# Patient Record
Sex: MALE | Race: WHITE | NOT HISPANIC OR LATINO | Employment: OTHER | URBAN - METROPOLITAN AREA
[De-identification: names, ages, dates, MRNs, and addresses within clinical notes are randomized per-mention and may not be internally consistent; named-entity substitution may affect disease eponyms.]

---

## 2022-10-04 ENCOUNTER — OFFICE VISIT (OUTPATIENT)
Dept: ENDOCRINOLOGY | Facility: CLINIC | Age: 67
End: 2022-10-04

## 2022-10-04 ENCOUNTER — TELEPHONE (OUTPATIENT)
Dept: ENDOCRINOLOGY | Facility: CLINIC | Age: 67
End: 2022-10-04

## 2022-10-04 VITALS
HEIGHT: 76 IN | SYSTOLIC BLOOD PRESSURE: 144 MMHG | DIASTOLIC BLOOD PRESSURE: 76 MMHG | WEIGHT: 261 LBS | HEART RATE: 74 BPM | BODY MASS INDEX: 31.78 KG/M2 | OXYGEN SATURATION: 98 %

## 2022-10-04 DIAGNOSIS — E11.65 TYPE 2 DIABETES MELLITUS WITH HYPERGLYCEMIA, UNSPECIFIED WHETHER LONG TERM INSULIN USE: Primary | ICD-10-CM

## 2022-10-04 DIAGNOSIS — E78.5 HYPERLIPIDEMIA, UNSPECIFIED HYPERLIPIDEMIA TYPE: ICD-10-CM

## 2022-10-04 LAB
EXPIRATION DATE: ABNORMAL
EXPIRATION DATE: NORMAL
GLUCOSE BLDC GLUCOMTR-MCNC: 179 MG/DL (ref 70–130)
HBA1C MFR BLD: 9.2 %
Lab: ABNORMAL
Lab: NORMAL

## 2022-10-04 PROCEDURE — 83036 HEMOGLOBIN GLYCOSYLATED A1C: CPT | Performed by: INTERNAL MEDICINE

## 2022-10-04 PROCEDURE — 99204 OFFICE O/P NEW MOD 45 MIN: CPT | Performed by: INTERNAL MEDICINE

## 2022-10-04 PROCEDURE — 3046F HEMOGLOBIN A1C LEVEL >9.0%: CPT | Performed by: INTERNAL MEDICINE

## 2022-10-04 PROCEDURE — 82947 ASSAY GLUCOSE BLOOD QUANT: CPT | Performed by: INTERNAL MEDICINE

## 2022-10-04 RX ORDER — MULTIVIT WITH MINERALS/LUTEIN
1000 TABLET ORAL DAILY
COMMUNITY
End: 2023-02-15

## 2022-10-04 RX ORDER — INSULIN LISPRO 100 [IU]/ML
INJECTION, SOLUTION INTRAVENOUS; SUBCUTANEOUS
Qty: 75 ML | Refills: 1 | Status: SHIPPED | OUTPATIENT
Start: 2022-10-04 | End: 2022-12-14 | Stop reason: SDUPTHER

## 2022-10-04 RX ORDER — ATORVASTATIN CALCIUM 80 MG/1
80 TABLET, FILM COATED ORAL DAILY
COMMUNITY
Start: 2022-08-11

## 2022-10-04 RX ORDER — INSULIN HUMAN 100 [IU]/ML
20 INJECTION, SUSPENSION SUBCUTANEOUS 3 TIMES DAILY
COMMUNITY
Start: 2022-07-07 | End: 2022-10-04

## 2022-10-04 RX ORDER — INSULIN DEGLUDEC INJECTION 100 U/ML
64 INJECTION, SOLUTION SUBCUTANEOUS DAILY
COMMUNITY
Start: 2022-09-14 | End: 2023-02-15

## 2022-10-04 RX ORDER — FENOFIBRATE 160 MG/1
160 TABLET ORAL DAILY
COMMUNITY
Start: 2022-08-27

## 2022-10-04 RX ORDER — CITALOPRAM 20 MG/1
20 TABLET ORAL DAILY
COMMUNITY
Start: 2022-07-20

## 2022-10-04 RX ORDER — OMEGA-3S/DHA/EPA/FISH OIL 1000-1400
CAPSULE,DELAYED RELEASE (ENTERIC COATED) ORAL DAILY
COMMUNITY

## 2022-10-04 RX ORDER — MULTIPLE VITAMINS W/ MINERALS TAB 9MG-400MCG
1 TAB ORAL DAILY
COMMUNITY

## 2022-10-04 RX ORDER — UBIDECARENONE 100 MG
100 CAPSULE ORAL DAILY
COMMUNITY

## 2022-10-04 RX ORDER — RAMIPRIL 10 MG/1
10 CAPSULE ORAL DAILY
COMMUNITY
Start: 2022-09-24

## 2022-10-04 NOTE — PROGRESS NOTES
Chief Complaint   Patient presents with   • Diabetes        New patient who is being seen in consultation regarding type 2 diabetes at the request of David Mar MD     HPI   Ayad Tong is a 67 y.o. male who presents for evaluation of type 2 diabetes.    Patient has a history of type 2 diabetes.  Patient reports that he was originally started on oral agents for diabetes.  He is unable to recall all the different medications that he has taken but does state that he did not tolerate metformin due to GI side effects.  He reports no side effects to the medications he was tried on but states they were ineffective in controlling his sugar and thus he was started on insulin.  Patient is currently taking Tresiba 64 units daily, Humulin N 20 units 3 times daily.  This regimen was last changed approximately 1 year ago.  Patient believes that glycemic control is worsening. Patient reports good adherence to medications.      Patient reports prior hypoglycemia with intense activity, none recent.  Patient monitors blood glucose daily in the morning.  He reports that fasting values generally around 140  Patient follows a diabetic diet.    Patient's last dilated eye exam was within the past year.  Patient denies acute visual changes.  Patient denies foot related concerns such as numbness, tingling, or pain.  History of dyslipidemia: Yes, taking atorvastatin 80 mg daily.  Patient denies myalgias  History of renal dysfunction: No  History of Hypertension: No    Past Medical History:   Diagnosis Date   • Type 2 diabetes mellitus (HCC)      Past Surgical History:   Procedure Laterality Date   • CARDIAC CATHETERIZATION     • NOSE SURGERY     • SHOULDER SURGERY     • TONSILLECTOMY        Family History   Problem Relation Age of Onset   • Dementia Mother    • Cancer Father    • Lung cancer Father    • Asthma Sister         hay fever   • No Known Problems Daughter    • No Known Problems Son       Social History     Socioeconomic  History   • Marital status:    Tobacco Use   • Smokeless tobacco: Never Used   Vaping Use   • Vaping Use: Never used   Substance and Sexual Activity   • Alcohol use: Never   • Drug use: Never   • Sexual activity: Defer      Allergies   Allergen Reactions   • Demerol [Meperidine] Nausea Only     And causes hypertension      Current Outpatient Medications on File Prior to Visit   Medication Sig Dispense Refill   • Ascorbic Acid 500 MG capsule Take  by mouth.     • atorvastatin (LIPITOR) 80 MG tablet Take 80 mg by mouth Daily.     • CINNAMON PO Take  by mouth 2 (Two) Times a Day. 2000 mg bid     • citalopram (CeleXA) 20 MG tablet Take 20 mg by mouth Daily.     • coenzyme Q10 100 MG capsule Take 100 mg by mouth Daily.     • fenofibrate 160 MG tablet Take 160 mg by mouth Daily.     • multivitamin with minerals tablet tablet Take 1 tablet by mouth Daily.     • Omega-3 Fatty Acids (Fish Oil Ultra) 1400 MG capsule Take  by mouth Daily.     • ramipril (ALTACE) 10 MG capsule Take 10 mg by mouth Daily.     • Tresiba FlexTouch 100 UNIT/ML solution pen-injector injection Inject 64 Units under the skin into the appropriate area as directed Daily.     • vitamin E 1000 UNIT capsule Take 1,000 Units by mouth Daily.     • [DISCONTINUED] HumuLIN N KwikPen 100 UNIT/ML injection Inject 20 Units under the skin into the appropriate area as directed 3 (Three) Times a Day.       No current facility-administered medications on file prior to visit.        Review of Systems   Constitutional: Negative for activity change, appetite change, unexpected weight gain and unexpected weight loss.   HENT: Positive for hearing loss and tinnitus.    Eyes: Negative for visual disturbance.   Gastrointestinal: Negative for abdominal pain, nausea and vomiting.   Endocrine: Negative for polydipsia and polyuria.   Musculoskeletal: Negative for myalgias.   Allergic/Immunologic: Positive for environmental allergies.     Vitals:    10/04/22 0827   BP:  "144/76   BP Location: Right arm   Patient Position: Sitting   Cuff Size: Adult   Pulse: 74   SpO2: 98%   Weight: 118 kg (261 lb)   Height: 193 cm (76\")   Body mass index is 31.77 kg/m².     Physical Exam  Vitals reviewed.   Constitutional:       General: He is not in acute distress.     Appearance: Normal appearance.   HENT:      Head: Normocephalic.   Eyes:      General: Lids are normal.      Conjunctiva/sclera: Conjunctivae normal.   Neck:      Thyroid: No thyromegaly or thyroid tenderness.   Cardiovascular:      Rate and Rhythm: Normal rate and regular rhythm.      Heart sounds: No murmur heard.  Pulmonary:      Effort: Pulmonary effort is normal.      Breath sounds: Normal breath sounds.   Abdominal:      General: Bowel sounds are normal.      Palpations: Abdomen is soft.      Tenderness: There is no abdominal tenderness.   Lymphadenopathy:      Head:      Right side of head: No submandibular adenopathy.      Left side of head: No submandibular adenopathy.      Cervical: No cervical adenopathy.   Skin:     General: Skin is warm and dry.      Findings: No rash.   Neurological:      General: No focal deficit present.      Mental Status: He is alert.   Psychiatric:         Mood and Affect: Mood and affect normal.         Behavior: Behavior is cooperative.            Labs/Imaging   Latest Reference Range & Units 10/04/22 08:45 10/04/22 08:46   Glucose 70 - 130 mg/dL 179    Hemoglobin A1C %  9.2     Labs dated 6/21/2022  A1c 8.3%  Alkaline phosphatase 48  ALT 33  AST 25  GFR >60  TSH 2.79  Free T4 0.73  LDL 63  Triglycerides 280  Assessment and Plan    Diagnoses and all orders for this visit:    1. Type 2 diabetes mellitus with hyperglycemia, unspecified whether long term insulin use (HCC) (Primary)  Diabetes uncontrolled with hemoglobin A1c 9.2%  Patient reports fasting sugars at goal, suspect hyperglycemia later in the day but patient does not generally check sugar at that time.  Discussed with patient that he is " currently taking a basal and intermediate acting insulin.  Discussed that NPH onset and duration of action does not provide optimal mealtime coverage.  Plan to change Novolin N to a true short acting insulin.  Prescription sent for Humalog.  Patient will take 20 units with meals plus correction 2 per 50 greater than 150  Plan to continue Tresiba 64 units daily as fasting sugars Reported by patient are at goal  Patient was advised to monitor blood sugar 3 times daily.  Patient was instructed to bring glucometer to all future appointments. Patient should contact the clinic between appointments with hypoglycemia or persistent hyperglycemia.  Discussed signs and symptoms of hypoglycemia as well as hypoglycemia management via the rule of 15's.  Discussed potential for long-term complications with uncontrolled diabetes including nephropathy, neuropathy, retinopathy, increased risk for cardiac disease.  Discussed the role of diet and exercise in the management of diabetes.  CMP, lipids up-to-date from June 2022  -     POC Glucose, Blood  -     POC Glycosylated Hemoglobin (Hb A1C)    2. Hyperlipidemia, unspecified hyperlipidemia type  Currently taking atorvastatin 80 mg daily  Most recent LDL at goal    Other orders  -     Insulin Lispro, 1 Unit Dial, (HumaLOG KwikPen) 100 UNIT/ML solution pen-injector; For use at mealtimes and per correctional scale, MDD 80 units  Dispense: 75 mL; Refill: 1     Addendum dated 2/1/2023  Received outside labs from PCP, sent for scanning  Testing relevant to endocrine condition outlined below  labs dated 12/6/22  Hemoglobin A1c 7.8  Alkaline phosphatase 41  ALT 26  AST 22  eGFR 56  TSH 5.61  Free T40.55  Triglycerides 178  LDL 76    Return in about 3 months (around 1/4/2023) for Labs before next appointment. The patient was instructed to contact the clinic with any interval questions or concerns.    Odalis Velazquez MD     Dictated Utilizing Dragon Dictation

## 2022-10-04 NOTE — TELEPHONE ENCOUNTER
Pt called seen today, recevied a sample of Fiasp and was told he would get a mail order. He is leaving town would like to have sent it sent locally to [facility] that we have on file

## 2022-10-05 NOTE — TELEPHONE ENCOUNTER
Called the pt and he stated the phar called him. They told him the Humalog was at the phar but did not have enough. It could be a day or two before they have it in stock.    He verbalized understanding about the Humalog.

## 2022-10-06 ENCOUNTER — TELEPHONE (OUTPATIENT)
Dept: ENDOCRINOLOGY | Facility: CLINIC | Age: 67
End: 2022-10-06

## 2022-10-06 NOTE — TELEPHONE ENCOUNTER
PT CALLED STATING WE CHANGED HER INSULIN WHEN WE SAW HIM LAST. HE STATED HIS BS HAS BEEN RUNNING HIGH SINCE CHANGE TWO DAYS AGO. PT REQUESTED A CALL BACK TO CONSULT.

## 2022-10-06 NOTE — TELEPHONE ENCOUNTER
Called the pt and he stated his blood sugars are going up not down. This morning his blood sugar was 250 something he took 20 units plus 6. He had a breakfast burrito with 36 grams of carbs, coffee with heavy cream and sweetner. 3 hours later is was 340 something. At lunch he took 20 units plus 8. He is still using the sample you gave him.     The only med that is not on him list is an allergy pill. Not on any ABX or steroids    He could not give me any other readings he didn't have the meter with him.    Please advise.

## 2022-10-06 NOTE — TELEPHONE ENCOUNTER
Please contact patient, given hyperglycemia I would have him increase mealtime insulin to 26 units with meals plus correction 2 per 50 greater than 150.  He should continue current dose of basal insulin.  If he is still having issues he should reach back out to the office.  He should also do his best to avoid sugary substances (sweetened coffee).

## 2022-12-14 RX ORDER — INSULIN LISPRO 100 [IU]/ML
INJECTION, SOLUTION INTRAVENOUS; SUBCUTANEOUS
Qty: 75 ML | Refills: 1 | Status: SHIPPED | OUTPATIENT
Start: 2022-12-14 | End: 2023-02-15 | Stop reason: SDUPTHER

## 2023-01-30 ENCOUNTER — TELEPHONE (OUTPATIENT)
Dept: ENDOCRINOLOGY | Facility: CLINIC | Age: 68
End: 2023-01-30
Payer: MEDICARE

## 2023-01-30 NOTE — TELEPHONE ENCOUNTER
PATIENT IS CALLING WITH QUESTIONS ABOUT A NEW PRESCRIPTION FOR UNDERACTIVE THYROID HE THINKS THE MEDICATION IS LEVOTHYROXINE WHICH IS CAUSING ISSUES WITH HIS BLOOD SUGARS RUNNING HIGH. HE WOULD LIKE A CALL BACK TO DISCUSS CONCERNS. PHONE NUMBER [phone]. HE HAS NOT SPOKE TO PCP YET ABOUT THIS ISSUES.

## 2023-01-31 NOTE — TELEPHONE ENCOUNTER
Called the pt he started taking Levothyroxine 4 days ago. And the next day his sugars went high.    He is taking tresiba 64 units daily and Humalog 26 units as a base alog with corrections at mealtimes.    He is sharing on [facility]. Report printed and gave to provider for review.    Please advise.

## 2023-01-31 NOTE — TELEPHONE ENCOUNTER
Freestyle henry reviewed.  Patient does have some hyperglycemia, however, the worst of this appears post-prandial per pattern and actually predates initiation of levothyroxine per report.  It is unlikely that any change in blood sugar is related to levothyroxine.    Of note, average sugar remains at goal despite this.  Patient does have an appointment in approximately 2 weeks.  Please request that he bring recent labs from PCP for review and we can discuss further at that time.  Okay to schedule sooner appointment, if patient desires.

## 2023-01-31 NOTE — TELEPHONE ENCOUNTER
Called pt and read him the message. He verbalized understanding.    He had labs done at Dr Beltran office. I told him I would call and get a copy of the labs to have at [facility].    Called and spoke to Buffy. Requested labs and fax number was provided.

## 2023-02-15 ENCOUNTER — TELEPHONE (OUTPATIENT)
Dept: ENDOCRINOLOGY | Facility: CLINIC | Age: 68
End: 2023-02-15

## 2023-02-15 ENCOUNTER — OFFICE VISIT (OUTPATIENT)
Dept: ENDOCRINOLOGY | Facility: CLINIC | Age: 68
End: 2023-02-15
Payer: MEDICARE

## 2023-02-15 VITALS
WEIGHT: 266 LBS | OXYGEN SATURATION: 97 % | HEART RATE: 80 BPM | BODY MASS INDEX: 32.39 KG/M2 | DIASTOLIC BLOOD PRESSURE: 92 MMHG | SYSTOLIC BLOOD PRESSURE: 154 MMHG | HEIGHT: 76 IN

## 2023-02-15 DIAGNOSIS — E11.65 TYPE 2 DIABETES MELLITUS WITH HYPERGLYCEMIA, UNSPECIFIED WHETHER LONG TERM INSULIN USE: Primary | ICD-10-CM

## 2023-02-15 DIAGNOSIS — E78.5 HYPERLIPIDEMIA, UNSPECIFIED HYPERLIPIDEMIA TYPE: ICD-10-CM

## 2023-02-15 DIAGNOSIS — E03.9 HYPOTHYROIDISM, UNSPECIFIED TYPE: ICD-10-CM

## 2023-02-15 LAB
EXPIRATION DATE: ABNORMAL
EXPIRATION DATE: NORMAL
GLUCOSE BLDC GLUCOMTR-MCNC: 225 MG/DL (ref 70–130)
HBA1C MFR BLD: 6.8 %
Lab: ABNORMAL
Lab: NORMAL

## 2023-02-15 PROCEDURE — 83036 HEMOGLOBIN GLYCOSYLATED A1C: CPT | Performed by: INTERNAL MEDICINE

## 2023-02-15 PROCEDURE — 95251 CONT GLUC MNTR ANALYSIS I&R: CPT | Performed by: INTERNAL MEDICINE

## 2023-02-15 PROCEDURE — 3044F HG A1C LEVEL LT 7.0%: CPT | Performed by: INTERNAL MEDICINE

## 2023-02-15 PROCEDURE — 99214 OFFICE O/P EST MOD 30 MIN: CPT | Performed by: INTERNAL MEDICINE

## 2023-02-15 RX ORDER — INSULIN DEGLUDEC INJECTION 100 U/ML
61 INJECTION, SOLUTION SUBCUTANEOUS DAILY
Qty: 60 ML | Refills: 1 | Status: SHIPPED | OUTPATIENT
Start: 2023-02-15

## 2023-02-15 RX ORDER — INSULIN ASPART 100 [IU]/ML
INJECTION, SOLUTION INTRAVENOUS; SUBCUTANEOUS
Qty: 75 ML | Refills: 1 | Status: SHIPPED | OUTPATIENT
Start: 2023-02-15 | End: 2023-04-05 | Stop reason: SDUPTHER

## 2023-02-15 RX ORDER — LEVOTHYROXINE SODIUM 0.03 MG/1
1 TABLET ORAL DAILY
COMMUNITY
Start: 2023-01-27

## 2023-02-15 RX ORDER — INSULIN LISPRO 100 [IU]/ML
INJECTION, SOLUTION INTRAVENOUS; SUBCUTANEOUS
Qty: 75 ML | Refills: 1 | Status: SHIPPED | OUTPATIENT
Start: 2023-02-15 | End: 2023-02-15

## 2023-02-15 RX ORDER — INSULIN ASPART 100 [IU]/ML
INJECTION, SOLUTION INTRAVENOUS; SUBCUTANEOUS
Qty: 75 ML | Refills: 1 | Status: SHIPPED | OUTPATIENT
Start: 2023-02-15 | End: 2023-02-15 | Stop reason: SDUPTHER

## 2023-02-15 NOTE — TELEPHONE ENCOUNTER
Received paper from the phar stating that Humalog is not covered. Can we change to Novolog with same directions?

## 2023-02-15 NOTE — PROGRESS NOTES
"Chief Complaint   Patient presents with   • Diabetes        HPI   Ayad Tong is a 67 y.o. male had concerns including Diabetes.      Patient presents for follow-up of diabetes, last seen October 2022. Patient does report that he believes glucose values are improved since last visit. He does note that glucose values tend to be highest in the morning. He has has some borderline low sugars after exercise. He was started on levothyroxine in the interim since last visit.    Current diabetes medications include the following:  Tresiba 61 units daily, patient reports he reduced dose as this comes out to the correct amount in each pen without having to do multiple injections.  Humalog 20 units with meals plus correction 2 per 50 greater than 150    Patient continues on atorvastatin 80 mg daily, denies myalgias.    The following portions of the patient's history were reviewed and updated as appropriate: allergies, current medications and past social history.    Review of Systems   Gastrointestinal: Negative for nausea and vomiting.   Endocrine: Negative for polydipsia and polyuria.   Musculoskeletal: Negative for myalgias.      /92 (BP Location: Right arm, Patient Position: Sitting, Cuff Size: Adult)   Pulse 80   Ht 193 cm (76\")   Wt 121 kg (266 lb)   SpO2 97%   BMI 32.38 kg/m²      Physical Exam      Constitutional:  well developed; well nourished  no acute distress   ENT/Thyroid: not examined   Eyes: Conjunctiva: clear   Respiratory:  breathing is unlabored  clear to auscultation bilaterally   Cardiovascular:  regular rate and rhythm   Chest:  Not performed.   Abdomen: soft, non-tender; no masses   : Not performed.   Musculoskeletal: Not performed   Skin: not performed.   Neuro: mental status, speech normal   Psych: mood and affect are within normal limits       Labs/Imaging   Latest Reference Range & Units 02/15/23 08:05 02/15/23 08:06   Glucose 70 - 130 mg/dL 225 !    Hemoglobin A1C %  6.8   !: Data is " abnormal     12/6/2022  TSH 5.61  Free T4     Freestyle henry downloaded for review for dates ranging February 2 through February 15, 2023, CGM active 52% of the time with average glucose 152, GMI 6.9% with 20.8% glucose variability.  82% of data within target range, 18% high.  Glucose values are generally stable, patient does have some rising glucose in the early morning hours as well as following meals.  Episodes of mild hypoglycemia occurring during the day and associated with exercise per patient report.    Diagnoses and all orders for this visit:    1. Type 2 diabetes mellitus with hyperglycemia, unspecified whether long term insulin use (HCC) (Primary)  Hemoglobin A1c at goal, patient with intermittent hyperglycemia, hypoglycemia  CGM containing 72 hours of glucose data was reviewed.  Discussed mechanisms for combating fasting hyperglycemia.  Patient does report he is going to try to start walking each morning, given this we will defer any medication changes  Patient to continue Tresiba 61 units daily  Patient to continue Humalog 20 units with meals plus correction 2 per 50 greater than 150.  We did discuss empiric reduction by 4 units if patient is planning to work out after a meal to avoid hypoglycemia.  Reviewed treatment of hypoglycemia.  Patient was advised to monitor blood sugar 3 times daily if not using CGM.  Patient was instructed to bring glucometer to all future appointments. Patient should contact the clinic between appointments with hypoglycemia or persistent hyperglycemia.  Discussed signs and symptoms of hypoglycemia as well as hypoglycemia management via the rule of 15's.  Discussed potential for long-term complications with uncontrolled diabetes including nephropathy, neuropathy, retinopathy, increased risk for cardiac disease.  Discussed the role of diet and exercise in the management of diabetes.  CMP, lipids up-to-date from June 2022  Patient reports that urine microalbumin was completed  recently with PCP, will request copy  Eye exam is scheduled for this year  -     POC Glucose, Blood  -     POC Glycosylated Hemoglobin (Hb A1C)    2. Hyperlipidemia, unspecified hyperlipidemia type  Lipid panel is up-to-date, continue atorvastatin    3.  Hypothyroidism, unspecified type  Labs from December 2022 reviewed, patient with elevated TSH, low free T4.  Patient taking levothyroxine 25 mcg daily per PCP, too soon to repeat labs     Addendum  Received labs from PCP, most recent urine microalbumin was from December 2021, update next visit    Return in about 4 months (around 6/15/2023). The patient was instructed to contact the clinic with any interval questions or concerns.    Odalis Velazquez MD   Endocrinologist    Dictated Utilizing Dragon Dictation

## 2023-02-15 NOTE — TELEPHONE ENCOUNTER
----- Message from Odalis Velazquez MD sent at 2/15/2023  8:30 AM EST -----  Can you request recent urine microalbumin from PCP?

## 2023-03-08 ENCOUNTER — TELEPHONE (OUTPATIENT)
Dept: ENDOCRINOLOGY | Facility: CLINIC | Age: 68
End: 2023-03-08
Payer: MEDICARE

## 2023-03-08 NOTE — TELEPHONE ENCOUNTER
Called the pt and he needs the [employer] sensors. He would like to have a 90 day supply sent to [facility]. He has been getting it from [employer].

## 2023-03-08 NOTE — TELEPHONE ENCOUNTER
PATIENT WANTS US TO START PRESCRIBING HIS [employer] DEVICE AND SUPPLIES. HIS INSURANCE CHANGED SO HE CAN NO LONGER GET THE PRESCRIPTIONS FROM [employer]. HIS NEW INSURANCE IS NOW [email or web address]. PATIENTS NUMBER [phone]

## 2023-04-05 RX ORDER — INSULIN ASPART 100 [IU]/ML
INJECTION, SOLUTION INTRAVENOUS; SUBCUTANEOUS
Qty: 75 ML | Refills: 1 | Status: SHIPPED | OUTPATIENT
Start: 2023-04-05

## 2023-06-13 ENCOUNTER — OFFICE VISIT (OUTPATIENT)
Dept: ENDOCRINOLOGY | Facility: CLINIC | Age: 68
End: 2023-06-13
Payer: MEDICARE

## 2023-06-13 VITALS
HEART RATE: 68 BPM | HEIGHT: 76 IN | WEIGHT: 267 LBS | SYSTOLIC BLOOD PRESSURE: 140 MMHG | DIASTOLIC BLOOD PRESSURE: 90 MMHG | BODY MASS INDEX: 32.51 KG/M2 | OXYGEN SATURATION: 97 %

## 2023-06-13 DIAGNOSIS — E11.65 TYPE 2 DIABETES MELLITUS WITH HYPERGLYCEMIA, UNSPECIFIED WHETHER LONG TERM INSULIN USE: Primary | ICD-10-CM

## 2023-06-13 DIAGNOSIS — E78.5 HYPERLIPIDEMIA, UNSPECIFIED HYPERLIPIDEMIA TYPE: ICD-10-CM

## 2023-06-13 DIAGNOSIS — E03.9 HYPOTHYROIDISM, UNSPECIFIED TYPE: ICD-10-CM

## 2023-06-13 LAB
EXPIRATION DATE: ABNORMAL
EXPIRATION DATE: NORMAL
GLUCOSE BLDC GLUCOMTR-MCNC: 153 MG/DL (ref 70–130)
HBA1C MFR BLD: 6.4 %
Lab: ABNORMAL
Lab: NORMAL

## 2023-06-13 RX ORDER — INSULIN DEGLUDEC INJECTION 100 U/ML
64 INJECTION, SOLUTION SUBCUTANEOUS DAILY
Qty: 60 ML | Refills: 1 | Status: SHIPPED | OUTPATIENT
Start: 2023-06-13

## 2023-06-13 RX ORDER — INSULIN ASPART 100 [IU]/ML
INJECTION, SOLUTION INTRAVENOUS; SUBCUTANEOUS
Qty: 90 ML | Refills: 1 | Status: SHIPPED | OUTPATIENT
Start: 2023-06-13

## 2023-06-13 NOTE — PROGRESS NOTES
"Chief Complaint   Patient presents with   • Diabetes        HPI   Ayad Tong is a 68 y.o. male had concerns including Diabetes.      Patient was last seen in February 2023.  He continues glucose monitoring via freestyle henry.    Current diabetes medications include the following:  Tresiba 64 units daily  NovoLog 26 units with meals plus correction 2 per 50 greater than 150    Patient denies any recent issues with hypoglycemia.  He does report that exercise has been limited due to a bony issue in his foot.  He is seeing orthopedics for this.    Patient continues on atorvastatin, fenofibrate for hyperlipidemia.  Patient continues on levothyroxine.  He does report that he had repeat labs last week with PCP.    The following portions of the patient's history were reviewed and updated as appropriate: allergies, current medications and past social history.    Review of Systems   Musculoskeletal: Foot pain    /90 (BP Location: Right arm, Patient Position: Sitting, Cuff Size: Adult)   Pulse 68   Ht 193 cm (76\")   Wt 121 kg (267 lb)   SpO2 97%   BMI 32.50 kg/m²      Physical Exam      Constitutional:  well developed; well nourished  no acute distress  appears stated age   ENT/Thyroid: not examined   Eyes: Conjunctiva: clear   Respiratory:  breathing is unlabored  clear to auscultation bilaterally   Cardiovascular:  regular rate and rhythm   Chest:  Not performed.   Abdomen: Not performed.   : Not performed.   Musculoskeletal: Not performed   Skin: not performed.   Neuro: mental status, speech normal   Psych: mood and affect are within normal limits       Labs/Imaging   Latest Reference Range & Units 06/13/23 08:53   Glucose 70 - 130 mg/dL 153 !   Hemoglobin A1C % 6.4   !: Data is abnormal    Freestyle henry downloaded for review for dates ranging May 31 through June 13, 2023, CGM is active 92% of the time with average glucose 145, GMI 6.8% with 22.9% glucose variability.  85% of data within target range, " 14% high, 1% very low.  Patient reports that reported low was a sensor error.  No other hypoglycemia noted.  Patient with minimal postprandial glucose elevation.    Diagnoses and all orders for this visit:    1. Type 2 diabetes mellitus with hyperglycemia, unspecified whether long term insulin use (Primary)  Diabetes is well controlled, hemoglobin A1c 6.4%  CGM containing 72 hours of glucose data was downloaded and reviewed  Continue Tresiba 64 units daily  Continue NovoLog 26 units with meals plus correction 2 per 50 greater than 150  Patient was advised to monitor blood sugar 3 times daily if not using freestyle.  Patient was instructed to bring glucometer to all future appointments. Patient should contact the clinic between appointments with hypoglycemia or persistent hyperglycemia.  Discussed signs and symptoms of hypoglycemia as well as hypoglycemia management via the rule of 15's.  Discussed potential for long-term complications with uncontrolled diabetes including nephropathy, neuropathy, retinopathy, increased risk for cardiac disease.  Discussed the role of diet and exercise in the management of diabetes.    Eye exam is up-to-date from April 2022  Requesting recent labs from PCP  -     POC Glucose, Blood  -     POC Glycosylated Hemoglobin (Hb A1C)    2. Hyperlipidemia, unspecified hyperlipidemia type  Requesting recent labs from PCP, patient continues on fenofibrate, atorvastatin    3. Hypothyroidism, unspecified type  Patient continues on levothyroxine, requesting recent labs    Other orders  -     Tresiba FlexTouch 100 UNIT/ML solution pen-injector injection; Inject 64 Units under the skin into the appropriate area as directed Daily.  Dispense: 60 mL; Refill: 1  -     insulin aspart (NovoLOG FlexPen) 100 UNIT/ML solution pen-injector sc pen; For use at mealtimes and per correctional scale,  units  Dispense: 90 mL; Refill: 1         Return in about 6 months (around 12/13/2023) for Next scheduled  follow up. The patient was instructed to contact the clinic with any interval questions or concerns.    Odalis Velazquez MD   Endocrinologist    Dictated Utilizing Dragon Dictation

## 2023-07-21 ENCOUNTER — TELEPHONE (OUTPATIENT)
Dept: CARDIOLOGY | Facility: CLINIC | Age: 68
End: 2023-07-21

## 2023-07-21 NOTE — TELEPHONE ENCOUNTER
----- Message from Salvatore Castaneda MD sent at 7/21/2023  1:16 PM EDT -----  Patient's GLENN test shows he does have some small blood vessel disease in the right foot.  He is already established on a statin and I recommended he start aspirin 81 mg at the office visit.  No other changes recommended at this time.

## 2023-07-25 NOTE — TELEPHONE ENCOUNTER
Called pt, no answer, LVM stating I would send results to him in a [email or web address] message and to call with any questions.

## 2023-11-28 RX ORDER — EZETIMIBE 10 MG/1
10 TABLET ORAL DAILY
Qty: 90 TABLET | Refills: 3 | Status: SHIPPED | OUTPATIENT
Start: 2023-11-28

## 2023-11-30 RX ORDER — INSULIN ASPART 100 [IU]/ML
INJECTION, SOLUTION INTRAVENOUS; SUBCUTANEOUS
Qty: 90 ML | Refills: 0 | Status: SHIPPED | OUTPATIENT
Start: 2023-11-30

## 2023-11-30 NOTE — TELEPHONE ENCOUNTER
Rx Refill Note    Requested Prescriptions     Pending Prescriptions Disp Refills    insulin aspart (NovoLOG FlexPen) 100 UNIT/ML solution pen-injector sc pen [Pharmacy Med Name: NOVOLOG FLEXPEN 100 UNIT/ML Solution Pen-injector] 90 mL 3     Sig: INJECT AT MEALTIMES AND PER CORRECTIONAL SCALE AS DIRECTED (DISCARD PEN 28 DAYS AFTER OPENING , MAX DAILY DOSE  UNITS)        Last office visit with prescribing clinician: 6/13/2023       Next office visit with prescribing clinician: 12/13/2023     {    Cynthia Ramsay MA  11/30/23, 07:36 EST

## 2023-12-13 ENCOUNTER — OFFICE VISIT (OUTPATIENT)
Dept: ENDOCRINOLOGY | Facility: CLINIC | Age: 68
End: 2023-12-13
Payer: MEDICARE

## 2023-12-13 VITALS
BODY MASS INDEX: 32.76 KG/M2 | WEIGHT: 269 LBS | HEART RATE: 88 BPM | SYSTOLIC BLOOD PRESSURE: 136 MMHG | DIASTOLIC BLOOD PRESSURE: 84 MMHG | OXYGEN SATURATION: 98 % | HEIGHT: 76 IN

## 2023-12-13 DIAGNOSIS — E03.9 HYPOTHYROIDISM, UNSPECIFIED TYPE: ICD-10-CM

## 2023-12-13 DIAGNOSIS — E11.65 TYPE 2 DIABETES MELLITUS WITH HYPERGLYCEMIA, UNSPECIFIED WHETHER LONG TERM INSULIN USE: Primary | ICD-10-CM

## 2023-12-13 DIAGNOSIS — E78.5 HYPERLIPIDEMIA, UNSPECIFIED HYPERLIPIDEMIA TYPE: ICD-10-CM

## 2023-12-13 LAB
EXPIRATION DATE: ABNORMAL
EXPIRATION DATE: NORMAL
GLUCOSE BLDC GLUCOMTR-MCNC: 112 MG/DL (ref 70–130)
HBA1C MFR BLD: 6.4 % (ref 4.5–5.7)
Lab: ABNORMAL
Lab: NORMAL

## 2023-12-13 NOTE — PROGRESS NOTES
"Chief Complaint   Patient presents with    Diabetes        HPI   Ayad Tong is a 68 y.o. male had concerns including Diabetes.      Patient reports no significant health changes in the interim since last visit.  He is monitoring glucose via freestyle henry.    Current diabetes medications include the following:  Tresiba 64 units daily  NovoLog 26 units with meals plus correction 2 per 50 greater than 150, he reports generally never adding more than 2 to 4 units.    Patient reports that low alarm noted on recent CGM download was a false reading.    Patient continues on fenofibrate, atorvastatin, Zetia for hyperlipidemia.    Continues on levothyroxine 25 mcg daily for hypothyroidism.    Patient reports that he had completed labs yesterday with his PCP.    The following portions of the patient's history were reviewed and updated as appropriate: allergies, current medications, and past social history.    Review of Systems   Constitutional:  Negative for activity change.      /84 (BP Location: Left arm, Patient Position: Sitting, Cuff Size: Adult)   Pulse 88   Ht 193 cm (76\")   Wt 122 kg (269 lb)   SpO2 98%   BMI 32.74 kg/m²      Physical Exam      Constitutional:  well developed; well nourished  no acute distress   ENT/Thyroid: not examined   Eyes: Conjunctiva: clear   Respiratory:  breathing is unlabored  clear to auscultation bilaterally   Cardiovascular:  regular rate and rhythm   Chest:  Not performed.   Abdomen: Not performed.   : Not performed.   Musculoskeletal: Not performed   Skin: not performed.   Neuro: mental status, speech normal   Psych: mood and affect are within normal limits       Labs/Imaging  Labs dated 6/6/2023  eGFR 53  Hemoglobin A1c 6.5  TSH 2.7  Free T40.82  Total cholesterol 97    Alkaline phosphatase 40  ALT 32  AST 26  Urine microalbumin not available    Freestyle henry downloaded for review for dates ranging November 30 through December 13, 2023, CGM is active 91% of " the time with average glucose 134, GMI 6.5% with 22.9% glucose variability.  91% of data within target range, 9% high.  Patient has mild postprandial hyperglycemia which is generally transient.  No convincing pattern of hypoglycemia.     Latest Reference Range & Units 12/13/23 08:20 12/13/23 08:21   Glucose 70 - 130 mg/dL 112    Hemoglobin A1C 4.5 - 5.7 %  6.4 !   !: Data is abnormal    Diagnoses and all orders for this visit:    1. Type 2 diabetes mellitus with hyperglycemia, unspecified whether long term insulin use (Primary)  -     POC Glucose, Blood  -     POC Glycosylated Hemoglobin (Hb A1C)  Hemoglobin A1c at goal, 6.4%  CGM containing 72 hours of glucose data was downloaded and reviewed  No changes to medical therapy today  Patient will continue Tresiba 64 units daily and NovoLog 26 units with meals plus correction 2 per 50 greater than 150  Reviewed goals for glycemic control  CMP, lipid panel reviewed from June 2023  Urine microalbumin due, patient will enroll in patient portal so that outside labs may be reviewed during next appointment.    2. Hyperlipidemia, unspecified hyperlipidemia type   on labs from June 2023, patient will continue Zetia, fenofibrate, atorvastatin    3. Hypothyroidism, unspecified type  Most recent TSH was normal in June, continue levothyroxine 25 mcg daily      Return in about 6 months (around 6/13/2024). The patient was instructed to contact the clinic with any interval questions or concerns.    Electronically signed by: Odalis Velazquez MD   Endocrinologist    Dictated Utilizing Dragon Dictation

## 2024-02-16 RX ORDER — INSULIN ASPART 100 [IU]/ML
INJECTION, SOLUTION INTRAVENOUS; SUBCUTANEOUS
Qty: 90 ML | Refills: 1 | Status: SHIPPED | OUTPATIENT
Start: 2024-02-16

## 2024-06-12 ENCOUNTER — OFFICE VISIT (OUTPATIENT)
Dept: ENDOCRINOLOGY | Facility: CLINIC | Age: 69
End: 2024-06-12
Payer: MEDICARE

## 2024-06-12 VITALS
SYSTOLIC BLOOD PRESSURE: 130 MMHG | WEIGHT: 259 LBS | BODY MASS INDEX: 31.54 KG/M2 | HEART RATE: 63 BPM | DIASTOLIC BLOOD PRESSURE: 80 MMHG | OXYGEN SATURATION: 98 % | HEIGHT: 76 IN

## 2024-06-12 DIAGNOSIS — E11.65 TYPE 2 DIABETES MELLITUS WITH HYPERGLYCEMIA, UNSPECIFIED WHETHER LONG TERM INSULIN USE: Primary | ICD-10-CM

## 2024-06-12 DIAGNOSIS — E78.5 HYPERLIPIDEMIA, UNSPECIFIED HYPERLIPIDEMIA TYPE: ICD-10-CM

## 2024-06-12 LAB
EXPIRATION DATE: ABNORMAL
EXPIRATION DATE: NORMAL
GLUCOSE BLDC GLUCOMTR-MCNC: 130 MG/DL (ref 70–130)
HBA1C MFR BLD: 5.7 % (ref 4.5–5.7)
Lab: ABNORMAL
Lab: NORMAL

## 2024-06-12 PROCEDURE — 99214 OFFICE O/P EST MOD 30 MIN: CPT | Performed by: INTERNAL MEDICINE

## 2024-06-12 PROCEDURE — 3044F HG A1C LEVEL LT 7.0%: CPT | Performed by: INTERNAL MEDICINE

## 2024-06-12 PROCEDURE — 1160F RVW MEDS BY RX/DR IN RCRD: CPT | Performed by: INTERNAL MEDICINE

## 2024-06-12 PROCEDURE — 83036 HEMOGLOBIN GLYCOSYLATED A1C: CPT | Performed by: INTERNAL MEDICINE

## 2024-06-12 PROCEDURE — 1159F MED LIST DOCD IN RCRD: CPT | Performed by: INTERNAL MEDICINE

## 2024-06-12 PROCEDURE — 95251 CONT GLUC MNTR ANALYSIS I&R: CPT | Performed by: INTERNAL MEDICINE

## 2024-06-12 RX ORDER — DIPHENHYDRAMINE HCL 25 MG
TABLET ORAL
COMMUNITY
Start: 2024-02-23

## 2024-06-12 RX ORDER — INSULIN DEGLUDEC 100 U/ML
60 INJECTION, SOLUTION SUBCUTANEOUS DAILY
Qty: 60 ML | Refills: 1 | Status: SHIPPED | OUTPATIENT
Start: 2024-06-12

## 2024-06-12 RX ORDER — INSULIN ASPART 100 [IU]/ML
INJECTION, SOLUTION INTRAVENOUS; SUBCUTANEOUS
Qty: 90 ML | Refills: 1 | Status: SHIPPED | OUTPATIENT
Start: 2024-06-12

## 2024-06-12 NOTE — PROGRESS NOTES
"Chief Complaint   Patient presents with    Diabetes        HPI   Ayad Tong is a 69 y.o. male had concerns including Diabetes.      Patient reports no significant health changes in the interim since his last visit.  He has had some low sugars overnight but reports he will generally snack before going to bed if needed to avoid this.  He also reports he has to be more careful in the summer when he is more active to avoid lows.  He is monitoring glucose via freestyle henry.    Current diabetes medications include the following:  Tresiba 64 units daily  NovoLog 26 units with meals plus correction 2 per 50 greater than 150, patient reports generally not using more than 2 units of correction.    Patient reports eye exam is up-to-date from within the past year and did not show retinopathy.  This is completed at [facility].    The following portions of the patient's history were reviewed and updated as appropriate: allergies, current medications, and past social history.    Review of Systems   Constitutional:  Positive for activity change.        /80   Pulse 63   Ht 193 cm (75.98\")   Wt 117 kg (259 lb)   SpO2 98%   BMI 31.54 kg/m²      Physical Exam      Constitutional:  well developed; well nourished  no acute distress  appears stated age   ENT/Thyroid: not examined   Eyes: Conjunctiva: clear   Respiratory:  breathing is unlabored  clear to auscultation bilaterally   Cardiovascular:  regular rate and rhythm   Chest:  Not performed.   Abdomen: Not performed.   : Not performed.   Musculoskeletal: Not performed   Skin: not performed.   Neuro: mental status, speech normal   Psych: mood and affect are within normal limits       Labs/Imaging  Glucose:  Lab Results   Component Value Date    POCGLU 130 06/12/2024     HbA1c:  Lab Results   Component Value Date    HGBA1C 5.7 (A) 06/12/2024    HGBA1C 6.4 (A) 12/13/2023       Freestyle henry downloaded for review for dates ranging May 30 through June 12, 2024, CGM is " active 50% of the time with average glucose 124, GMI 6.3% with 24.8% glucose variability.  93% of data is within target range, 3% low, 4% high.  Hypoglycemia is most frequently occurring overnight.  Patient has mild postprandial hyperglycemia     labs from PCPs office dated 12/12/2023  Alkaline phosphatase 35  ALT 29  AST 27  eGFR 52  Glucose 171  Hemoglobin A1c 6.9  LDL 51  TSH 3.31  Free T40.85  Total cholesterol 120  Triglycerides 135    Diagnoses and all orders for this visit:    1. Type 2 diabetes mellitus with hyperglycemia, unspecified whether long term insulin use (Primary)  -     POC Glycosylated Hemoglobin (Hb A1C)  -     POC Glucose, Blood  -     Microalbumin / Creatinine Urine Ratio - Urine, Clean Catch; Future  Diabetes is well-controlled with hemoglobin A1c 5.7%  Patient does have some fasting hypoglycemia, plan to reduce Tresiba to 60 units daily.  Patient to contact clinic if he has continued hypoglycemia despite change.  Patient will continue NovoLog 26 units with meals plus correction 2 per 50 greater than 150  Reviewed goals for glucose control.  Patient will continue monitoring via CGM.  Labs from primary care reviewed from December 2023 including CMP, lipid panel.  Urine microalbumin is not available.  Patient was given orders to complete urine microalbumin  Patient reports eye exam is up-to-date from within the past year    2. Hyperlipidemia, unspecified hyperlipidemia type  Most recent LDL at goal, patient will continue current medications.    Other orders  -     Tresiba FlexTouch 100 UNIT/ML solution pen-injector injection; Inject 60 Units under the skin into the appropriate area as directed Daily.  Dispense: 60 mL; Refill: 1  -     insulin aspart (NovoLOG FlexPen) 100 UNIT/ML solution pen-injector sc pen; INJECT AT MEALTIMES AND PER CORRECTIONAL SCALE AS DIRECTED (DISCARD PEN 28 DAYS AFTER OPENING , MAX DAILY DOSE  UNITS)  Dispense: 90 mL; Refill: 1         Return in about 6 months  (around 12/12/2024). The patient was instructed to contact the clinic with any interval questions or concerns.    Electronically signed by: Odalis Velazquez MD   Endocrinologist    Dictated Utilizing Dragon Dictation

## 2024-07-05 RX ORDER — INSULIN ASPART 100 [IU]/ML
INJECTION, SOLUTION INTRAVENOUS; SUBCUTANEOUS
Qty: 90 ML | Refills: 2 | Status: SHIPPED | OUTPATIENT
Start: 2024-07-05

## 2024-08-06 ENCOUNTER — OFFICE VISIT (OUTPATIENT)
Dept: CARDIOLOGY | Facility: CLINIC | Age: 69
End: 2024-08-06
Payer: MEDICARE

## 2024-08-06 VITALS
WEIGHT: 259.6 LBS | HEIGHT: 76 IN | SYSTOLIC BLOOD PRESSURE: 130 MMHG | HEART RATE: 76 BPM | DIASTOLIC BLOOD PRESSURE: 70 MMHG | BODY MASS INDEX: 31.61 KG/M2 | OXYGEN SATURATION: 97 %

## 2024-08-06 DIAGNOSIS — E78.5 HYPERLIPIDEMIA, UNSPECIFIED HYPERLIPIDEMIA TYPE: Primary | ICD-10-CM

## 2024-08-06 PROCEDURE — 99214 OFFICE O/P EST MOD 30 MIN: CPT | Performed by: INTERNAL MEDICINE

## 2024-08-06 RX ORDER — PEN NEEDLE, DIABETIC 32GX 5/32"
NEEDLE, DISPOSABLE MISCELLANEOUS
COMMUNITY
Start: 2024-07-08

## 2024-08-06 NOTE — PROGRESS NOTES
[facility]  Follow Up Visit  Ayad Tong  1955    VISIT DATE:  08/06/24    PCP:   Murray Beltran MD  [address] [id number]          CC:  Hyperlipidemia, unspecified hyperlipidemia type      Problem List:  HLD  DM  HTN     Cardiac testing: Cardiac catheterization 2010.  Nonobstructive disease.  20 to 30% stenosis in the RCA, l LAD 30% stenosis      History of Present Illness:  Ayad Tong  Is a 69 y.o. male with pertinent cardiac history detailed above.  Patient returns for 1 year follow-up.  Patient is doing well at this time with no complaints of chest pain or dyspnea.  No claudication.  Blood pressure is controlled.  He believes he had recent lipids done within the past couple months.  His blood pressure is doing well today.  He recently had his insulin amounts decreased due to a low hemoglobin A1c.  He follows with endocrinology.  No other complaints today      Patient Active Problem List    Diagnosis Date Noted    Type 2 diabetes mellitus with hyperglycemia 02/15/2023    Hyperlipidemia 02/15/2023       Allergies   Allergen Reactions    Meperidine Nausea Only and Unknown - Low Severity     And causes hypertension       Social History     Socioeconomic History    Marital status:    Tobacco Use    Smoking status: Never    Smokeless tobacco: Never   Vaping Use    Vaping status: Never Used   Substance and Sexual Activity    Alcohol use: Yes     Alcohol/week: 1.0 standard drink of alcohol     Types: 1 Drinks containing 0.5 oz of alcohol per week    Drug use: Never    Sexual activity: Yes     Partners: Female     Birth control/protection: None       Family History   Problem Relation Age of Onset    Dementia Mother     Cancer Father     Lung cancer Father     Hypertension Father     Asthma Sister         hay fever    No Known Problems Daughter     No Known Problems Son        Current Medications:    Current Outpatient Medications:      "aspirin 81 MG EC tablet, Take 1 tablet by mouth Daily., Disp: 30 tablet, Rfl: 6    atorvastatin (LIPITOR) 80 MG tablet, Take 1 tablet by mouth Daily., Disp: , Rfl:     Blood Glucose Monitoring Suppl (True Metrix Air Glucose Meter) w/Device kit, , Disp: , Rfl:     citalopram (CeleXA) 20 MG tablet, Take 1 tablet by mouth Daily., Disp: , Rfl:     coenzyme Q10 100 MG capsule, Take 3 capsules by mouth Daily., Disp: , Rfl:     Continuous Blood Gluc Sensor (FreeStyle Chapito 2 Sensor) misc, 1 each Every 14 (Fourteen) Days., Disp: 10 each, Rfl: 3    Droplet Pen Needles 32G X 4 MM misc, , Disp: , Rfl:     ezetimibe (ZETIA) 10 MG tablet, Take 1 tablet by mouth Daily., Disp: 90 tablet, Rfl: 3    fenofibrate 160 MG tablet, Take 1 tablet by mouth Daily., Disp: , Rfl:     levothyroxine (SYNTHROID, LEVOTHROID) 25 MCG tablet, Take 1 tablet by mouth Daily., Disp: , Rfl:     multivitamin with minerals tablet tablet, Take 1 tablet by mouth Daily., Disp: , Rfl:     NovoLOG FlexPen 100 UNIT/ML solution pen-injector sc pen, INJECT AT MEALTIMES AND PER CORRECTIONAL SCALE AS DIRECTED (DISCARD PEN 28 DAYS AFTER OPENING , MAX DAILY DOSE  UNITS), Disp: 90 mL, Rfl: 2    Omega-3 Fatty Acids (Fish Oil Ultra) 1400 MG capsule, Take  by mouth Daily., Disp: , Rfl:     ramipril (ALTACE) 10 MG capsule, Take 1 capsule by mouth Daily., Disp: , Rfl:     Tresiba FlexTouch 100 UNIT/ML solution pen-injector injection, Inject 60 Units under the skin into the appropriate area as directed Daily., Disp: 60 mL, Rfl: 1     Review of Systems   Cardiovascular: Negative.    Respiratory: Negative.         Vitals:    08/06/24 1521   BP: 130/70   BP Location: Right arm   Patient Position: Sitting   Cuff Size: Adult   Pulse: 76   SpO2: 97%   Weight: 118 kg (259 lb 9.6 oz)   Height: 193 cm (76\")       Physical Exam  Constitutional:       Appearance: Normal appearance.   Cardiovascular:      Rate and Rhythm: Normal rate and regular rhythm.   Pulmonary:      Effort: " "Pulmonary effort is normal.      Breath sounds: Normal breath sounds.   Musculoskeletal:      Right lower leg: No edema.      Left lower leg: No edema.   Neurological:      General: No focal deficit present.      Mental Status: He is alert.         Diagnostic Data:  Procedures  No results found for: \"CHLPL\", \"TRIG\", \"HDL\", \"LDLDIRECT\"  No results found for: \"GLUCOSE\", \"BUN\", \"CREATININE\", \"NA\", \"K\", \"CL\", \"CO2\"  Lab Results   Component Value Date    HGBA1C 5.7 (A) 06/12/2024     No results found for: \"WBC\", \"HGB\", \"HCT\", \"PLT\"    Assessment:  No diagnosis found.    Plan:    Previous history nonobstructive CAD  -Continue aspirin 81 mg and statin therapy  -mild non-specific T wave change lateral leads  -Symptomatically monitor at this time  -No angina     2.  DM  -A1c 5.7  -Management per  Endocrinology  -GLENN 2023: Right toe brachial index 0.42 suggesting distal small vessel disease  -No claudication symptoms     3.  HLD  -Total cholesterol 187, triglycerides 193, HDL 36,   -continue liptior 80mg, Zetia  -Obtain his most recent lipid panel    Addendum: Lipids continue to show good control: Total cholesterol 108, triglycerides 122, HDL 37, LDL 47     4.  HTN  -Creatinine 1.35  On ramipril  No changes made in regimen today.  Blood pressure controlled      ACP discussion was held with the patient during this visit. Patient has an advance directive in EMR which is still valid.  Patient does not have an advance directive, declines further assistance.    Salvatore Castaneda MD FAC     "

## 2024-09-11 RX ORDER — EZETIMIBE 10 MG/1
10 TABLET ORAL DAILY
Qty: 90 TABLET | Refills: 3 | Status: SHIPPED | OUTPATIENT
Start: 2024-09-11

## 2024-12-05 ENCOUNTER — OFFICE VISIT (OUTPATIENT)
Dept: ENDOCRINOLOGY | Facility: CLINIC | Age: 69
End: 2024-12-05
Payer: MEDICARE

## 2024-12-05 VITALS
OXYGEN SATURATION: 98 % | SYSTOLIC BLOOD PRESSURE: 128 MMHG | BODY MASS INDEX: 32.17 KG/M2 | RESPIRATION RATE: 16 BRPM | DIASTOLIC BLOOD PRESSURE: 68 MMHG | HEIGHT: 76 IN | HEART RATE: 64 BPM | WEIGHT: 264.2 LBS

## 2024-12-05 DIAGNOSIS — E78.5 HYPERLIPIDEMIA, UNSPECIFIED HYPERLIPIDEMIA TYPE: ICD-10-CM

## 2024-12-05 DIAGNOSIS — E11.65 TYPE 2 DIABETES MELLITUS WITH HYPERGLYCEMIA, UNSPECIFIED WHETHER LONG TERM INSULIN USE: Primary | ICD-10-CM

## 2024-12-05 LAB
EXPIRATION DATE: ABNORMAL
GLUCOSE BLDC GLUCOMTR-MCNC: 132 MG/DL (ref 70–130)
HBA1C MFR BLD: 6.5 % (ref 4.5–5.7)
Lab: ABNORMAL

## 2024-12-05 RX ORDER — INSULIN DEGLUDEC 200 U/ML
INJECTION, SOLUTION SUBCUTANEOUS
COMMUNITY
Start: 2024-10-25 | End: 2024-12-05

## 2024-12-05 RX ORDER — INSULIN DEGLUDEC 200 U/ML
60 INJECTION, SOLUTION SUBCUTANEOUS DAILY
Qty: 30 ML | Refills: 1 | Status: SHIPPED | OUTPATIENT
Start: 2024-12-05

## 2024-12-05 RX ORDER — TIRZEPATIDE 2.5 MG/.5ML
2.5 INJECTION, SOLUTION SUBCUTANEOUS WEEKLY
Qty: 2 ML | Refills: 5 | Status: SHIPPED | OUTPATIENT
Start: 2024-12-05

## 2024-12-05 NOTE — PROGRESS NOTES
"Chief Complaint   Patient presents with    Diabetes        HPI   Ayad Tong is a 69 y.o. male had concerns including Diabetes.      Patient presents for follow-up of diabetes.  He does report that he reduced his insulin some during the summer due to increased activity and lower glucose values.  He has since resumed usual dosing.    Current diabetes medications include the following:  Tresiba 60 units daily  NovoLog 26 units with meals plus correction 2 per 50 greater than 150    Patient is taking atorvastatin 80 mg daily, Zetia 10 mg daily, fenofibrate 160 mg daily for hyperlipidemia    Patient reports eye exam was completed [facility] in summer 2024.  He does report that he had labs with his PCP in the interim since last visit, discussed that these were not sent for review.  Patient voiced understanding.  He reports he will see his PCP next week and will again request these to be sent.    The following portions of the patient's history were reviewed and updated as appropriate: allergies, current medications, and past social history.    Review of Systems   Constitutional:  Negative for activity change.      /68 (BP Location: Right arm, Patient Position: Sitting, Cuff Size: Large Adult)   Pulse 64   Resp 16   Ht 193 cm (75.98\")   Wt 120 kg (264 lb 3.2 oz)   SpO2 98%   BMI 32.17 kg/m²      Physical Exam      Constitutional:  well developed; well nourished  no acute distress  obese - Body mass index is 32.17 kg/m².   ENT/Thyroid: not examined   Eyes: Conjunctiva: clear   Respiratory:  breathing is unlabored  clear to auscultation bilaterally   Cardiovascular:  regular rate and rhythm   Chest:  Not performed.   Abdomen: Not performed.   : Not performed.   Musculoskeletal: Not performed   Skin: not performed.   Neuro: mental status, speech normal   Psych: mood and affect are within normal limits       Labs/Imaging  A1C:  Lab Results   Component Value Date    HGBA1C 6.5 (A) 12/05/2024    HGBA1C " 5.7 (A) 06/12/2024      Glucose:  Lab Results   Component Value Date    POCGLU 132 (A) 12/05/2024      labs from PCPs office dated 12/12/2023  Alkaline phosphatase 35  ALT 29  AST 27  eGFR 52  Glucose 171  Hemoglobin A1c 6.9  LDL 51  TSH 3.31  Free T40.85  Total cholesterol 120  Triglycerides 135    Freestyle henry downloaded for review for dates ranging November 22 through December 5, 2024, CGM is active 93% of the time with average glucose 144, GMI 6.8% with 22% glucose variability.  88% of data is within target range, 11% high, 1% low.  Patient prandial variation in glucose.  No significant hyperglycemia or hypoglycemia.    Diagnoses and all orders for this visit:    1. Type 2 diabetes mellitus with hyperglycemia, unspecified whether long term insulin use (Primary)  -     POC Glucose  -     POC Glycosylated Hemoglobin (Hb A1C)  Hemoglobin A1c is at goal, 6.5%  CGM containing 72 hours of glucose data was downloaded and reviewed.  Patient's primary concern is hypoglycemia with increased activity and how to prevent this.  We did discuss options for alternative medications to short acting insulin which would lessen hypoglycemia risk.  Patient is specifically interested in utilization of GLP-1 receptor agonist.  Common adverse effect of this medication class were reviewed including gastrointestinal side effects.  Patient will start Mounjaro 2.5 mg weekly.  When Mounjaro was started, patient will reduce NovoLog to 22 units with meals plus correction 2 per 50 greater than 150.  He will contact our clinic if glycemic values are not at target.  Patient will continue Tresiba 60 units daily  Will review interval labs once available  Eye exam is up-to-date per patient report    2. Hyperlipidemia, unspecified hyperlipidemia type  Most recent lipid panel available for review is from December 2023.  Patient will request PCP to send more recent data.  He will continue current lipid-lowering therapy.    Other orders  -      Insulin Degludec (Tresiba FlexTouch) 200 UNIT/ML solution pen-injector pen injection; Inject 60 Units under the skin into the appropriate area as directed Daily.  Dispense: 30 mL; Refill: 1  -     Tirzepatide (Mounjaro) 2.5 MG/0.5ML solution auto-injector; Inject 2.5 mg under the skin into the appropriate area as directed 1 (One) Time Per Week.  Dispense: 2 mL; Refill: 5         Return in about 3 months (around 3/5/2025). The patient was instructed to contact the clinic with any interval questions or concerns.    Electronically signed by: Odalis Velazquez MD   Endocrinologist    Dictated Utilizing Dragon Dictation

## 2025-01-08 LAB — MICROALBUMIN/CREAT UR: 38 MG/G (ref 0–29)

## 2025-01-11 ENCOUNTER — TRANSCRIBE ORDERS (OUTPATIENT)
Facility: HOSPITAL | Age: 70
End: 2025-01-11
Payer: MEDICARE

## 2025-01-11 ENCOUNTER — HOSPITAL ENCOUNTER (OUTPATIENT)
Facility: HOSPITAL | Age: 70
Discharge: HOME | End: 2025-01-11
Payer: MEDICARE

## 2025-01-11 DIAGNOSIS — R07.81 RIB PAIN ON LEFT SIDE: ICD-10-CM

## 2025-01-11 DIAGNOSIS — R07.81 RIB PAIN ON LEFT SIDE: Primary | ICD-10-CM

## 2025-01-11 PROCEDURE — 71100 X-RAY EXAM RIBS UNI 2 VIEWS: CPT

## 2025-03-19 ENCOUNTER — OFFICE VISIT (OUTPATIENT)
Dept: ENDOCRINOLOGY | Facility: CLINIC | Age: 70
End: 2025-03-19
Payer: MEDICARE

## 2025-03-19 VITALS
DIASTOLIC BLOOD PRESSURE: 80 MMHG | SYSTOLIC BLOOD PRESSURE: 140 MMHG | WEIGHT: 253 LBS | HEART RATE: 74 BPM | BODY MASS INDEX: 30.81 KG/M2 | HEIGHT: 76 IN

## 2025-03-19 DIAGNOSIS — E78.5 HYPERLIPIDEMIA, UNSPECIFIED HYPERLIPIDEMIA TYPE: ICD-10-CM

## 2025-03-19 DIAGNOSIS — E11.65 TYPE 2 DIABETES MELLITUS WITH HYPERGLYCEMIA, UNSPECIFIED WHETHER LONG TERM INSULIN USE: Primary | ICD-10-CM

## 2025-03-19 LAB
EXPIRATION DATE: ABNORMAL
EXPIRATION DATE: ABNORMAL
GLUCOSE BLDC GLUCOMTR-MCNC: 201 MG/DL (ref 70–130)
HBA1C MFR BLD: 6.2 % (ref 4.5–5.7)
Lab: ABNORMAL
Lab: ABNORMAL

## 2025-03-19 RX ORDER — HYDROCHLOROTHIAZIDE 12.5 MG/1
CAPSULE ORAL
Start: 2025-03-19

## 2025-03-19 RX ORDER — INSULIN DEGLUDEC 200 U/ML
34 INJECTION, SOLUTION SUBCUTANEOUS DAILY
Qty: 30 ML | Refills: 1 | Status: SHIPPED | OUTPATIENT
Start: 2025-03-19

## 2025-03-19 RX ORDER — INSULIN ASPART 100 [IU]/ML
INJECTION, SOLUTION INTRAVENOUS; SUBCUTANEOUS
Start: 2025-03-19

## 2025-03-19 RX ORDER — TIRZEPATIDE 5 MG/.5ML
5 INJECTION, SOLUTION SUBCUTANEOUS WEEKLY
Qty: 2 ML | Refills: 3 | Status: SHIPPED | OUTPATIENT
Start: 2025-03-19

## 2025-03-19 NOTE — PROGRESS NOTES
"Chief Complaint   Patient presents with    Diabetes        HPI   Ayad Tong is a 69 y.o. male had concerns including Diabetes.       Patient presents for follow-up of diabetes.  He was last seen in December 2024.  Patient reports some constipation since starting Mounjaro but notes this is manageable.  He has been able to reduce dosing of insulin since starting this therapy.  He estimates he has been on current doses for approximately 1 month.    Current diabetes medications include the following:  Tresiba 40 units daily  Mounjaro 2.5 mg weekly  NovoLog 14 units with meals plus correction 2 per 50 greater than 150    Patient continues on atorvastatin 80 mg daily, Zetia 10 mg daily, fenofibrate 160 mg daily for hyperlipidemia.    The following portions of the patient's history were reviewed and updated as appropriate: allergies, current medications, and past social history.    Review of Systems   Gastrointestinal:  Positive for constipation.      /80   Pulse 74   Ht 193 cm (75.98\")   Wt 115 kg (253 lb)   BMI 30.81 kg/m²      Physical Exam  Cardiovascular:      Pulses:           Dorsalis pedis pulses are 1+ on the right side and 2+ on the left side.   Feet:      Right foot:      Protective Sensation: 5 sites tested.  5 sites sensed.      Skin integrity: Skin integrity normal.      Left foot:      Protective Sensation: 5 sites tested.  5 sites sensed.      Skin integrity: Skin integrity normal.      Comments: Bony deformity noted, patient with bony prominence of lateral foot        Constitutional:  well developed; well nourished  no acute distress  appears stated age   ENT/Thyroid: not examined   Eyes: Conjunctiva: clear   Respiratory:  breathing is unlabored  clear to auscultation bilaterally   Cardiovascular:  regular rate and rhythm   Chest:  Not performed.   Abdomen: Not performed.   : Not performed.   Musculoskeletal: Not performed   Skin: not performed.   Neuro: mental status, speech normal "   Psych: mood and affect are within normal limits     Diabetic Foot Exam Performed and Monofilament Test Performed     Labs/Imaging  A1C:  Lab Results   Component Value Date    HGBA1C 6.2 (A) 03/19/2025    HGBA1C 6.5 (A) 12/05/2024      Glucose:  Lab Results   Component Value Date    POCGLU 201 (A) 03/19/2025     Outside labs dated 1/2/2025  Urine albumin to creatinine ratio 38, high  Hemoglobin A1c 6.9  eGFR 45  TSH 2.15  Free T4 0.9  Total cholesterol 106  Triglycerides 120  Calculated LDL 49  Alkaline phosphatase 28  ALT 30  AST 26    Freestyle henry downloaded for review for dates ranging March 6 through March 19, 2025, CGM is active 92% of the time with average glucose 123, GMI 6.3% with 20.7% glucose variability.  97% of data is within target range, 1% low, 2% high.  Patient has no persistent pattern of hyperglycemia or hypoglycemia.    Diagnoses and all orders for this visit:    1. Type 2 diabetes mellitus with hyperglycemia, unspecified whether long term insulin use (Primary)  -     POC Glucose, Blood  -     POC Glycosylated Hemoglobin (Hb A1C)  Diabetes is well-controlled with hemoglobin A1c 6.2%, improved from prior  CGM containing 72 hours of glucose data was downloaded and reviewed  Plan to increase Mounjaro to 5 mg weekly.  When this dose is increased patient will reduce Tresiba to 36 units daily, he will reduce NovoLog to 11 units prior to meals.  Discussed that he may need further reduction of insulin pending response.  Titration of insulin was reviewed.  Goals for glycemic control also reviewed.  Patient will contact the clinic in the interim between visits if values not at target or if he has adverse effect after changing therapy.  CMP, lipid panel, urine albumin to creatinine ratio were reviewed from January 2025  Monofilament exam completed today eye exam is up-to-date per patient from December 2024      2. Hyperlipidemia, unspecified hyperlipidemia type  Most recent lipid panel with LDL 49,  patient will continue atorvastatin, Zetia, fenofibrate    Other orders  -     Insulin Degludec (Tresiba FlexTouch) 200 UNIT/ML solution pen-injector pen injection; Inject 34 Units under the skin into the appropriate area as directed Daily.  Dispense: 30 mL; Refill: 1  -     insulin aspart (NovoLOG FlexPen) 100 UNIT/ML solution pen-injector sc pen; INJECT AT MEALTIMES AND PER CORRECTIONAL SCALE AS DIRECTED (DISCARD PEN 28 DAYS AFTER OPENING , MAX DAILY DOSE IS 50 UNITS)  -     Continuous Glucose Sensor (FreeStyle Chapito 3 Plus Sensor); Change sensor every 15 days  -     Tirzepatide (Mounjaro) 5 MG/0.5ML solution auto-injector; Inject 5 mg under the skin into the appropriate area as directed 1 (One) Time Per Week.  Dispense: 2 mL; Refill: 3      Return in about 4 months (around 7/19/2025). The patient was instructed to contact the clinic with any interval questions or concerns.    Electronically signed by: Odalis Velazquez MD   Endocrinologist    Dictated Utilizing Dragon Dictation

## 2025-05-27 ENCOUNTER — TELEPHONE (OUTPATIENT)
Dept: CARDIOLOGY | Facility: CLINIC | Age: 70
End: 2025-05-27
Payer: MEDICARE

## 2025-05-27 DIAGNOSIS — R94.31 ABNORMAL ELECTROCARDIOGRAM (ECG) (EKG): ICD-10-CM

## 2025-05-27 DIAGNOSIS — R00.2 PALPITATIONS: Primary | ICD-10-CM

## 2025-05-27 NOTE — TELEPHONE ENCOUNTER
Dr. Castaneda placed order of 7 day holter monitor for pt. He also suggested pt come to the clinic to get holter placed and get an EKG while in the office.    I spoke to pt. He has already talked to holter dept and they are going to mail the holter to him. He is going to come to clinic to get the EKG.

## 2025-05-27 NOTE — TELEPHONE ENCOUNTER
Spoke to pt. He states that for the last 3-4 days he is feeling like his heart is skipping beats. He states that he will check his pulse, and it feels like he is skipping beats. When this happens, he also states he can feel his heart beat in his chest. He does not have any chest pain or shortness of breath when he has the skipped beats. He states this happened to him many years ago, but that was because he was working nights at [employer] and drinking a lot of caffeine. He states since then, he has almost cut out all caffeine. He still drinks a cup of coffee every morning, but that is it. Drinks water the rest of the day.    He states he bought a blood pressure machine, but his blood pressure has been good. He said this am, it was 130/75 with a pulse of 66, and last night it was 122/69.    Pt states that he has lost a lot of weight, and had stopped his Ramipril. He said that while he was off it, he felt more relaxed and did not have any skipped beats. He states that he started the Ramipril back because his diastolic number started getting high. It was when he restarted the the Ramipril that he started having the skipped beats. The blood pressure readings he gave, is with him taking the Ramipril.     He states that the skipped beats seem to happen more in the afternoon/evening time. He said it did feel like he had more over the weekend. He states he was working in the yard yesterday, and felt fine, but when he came in an sat down, that is when he started having them.    He wants to know what he should do about the skipped beats.

## 2025-05-27 NOTE — TELEPHONE ENCOUNTER
Caller: BEATRIZ    Relationship to patient: [facility]     Best call back number: PLEASE CALL PT    Chief complaint:  SKIPPING HEART BEATS     Type of visit:  FOLLOW UP     Requested date: ASAP      Additional notes: PT LEFT A VM FOR [facility]. HE IS NOT A PT OF [facility], SO BEATRIZ CALLED OUR OFFICE TO MAKE US AWARE,. PLEASE CALL PT, THANK YOU

## 2025-05-28 ENCOUNTER — CLINICAL SUPPORT (OUTPATIENT)
Dept: CARDIOLOGY | Facility: CLINIC | Age: 70
End: 2025-05-28
Payer: MEDICARE

## 2025-05-28 DIAGNOSIS — R00.2 PALPITATIONS: Primary | ICD-10-CM

## 2025-05-28 NOTE — TELEPHONE ENCOUNTER
Spoke to pt and informed him what Dr. Castaneda said about EKG and stress test. He verbalized understanding.

## 2025-05-28 NOTE — TELEPHONE ENCOUNTER
EKG showed sinus rhythm.  Does have some T wave inversions in the lateral leads which are new from August 24.  Will recommend we obtain a stress test as well as the Holter monitor to ensure no underlying CAD.  I will place the order for the stress test.

## 2025-06-16 ENCOUNTER — RESULTS FOLLOW-UP (OUTPATIENT)
Dept: CARDIOLOGY | Facility: CLINIC | Age: 70
End: 2025-06-16

## 2025-06-16 NOTE — TELEPHONE ENCOUNTER
Spoke to pt and informed him what Dr. Castaneda said about holter results. He verbalized understanding. He said he is going to think about the medication. He said that since the palpitations are not dangerous, he doesn't know if he wants to take the medication. He will call back if he decides to try the Toprol.

## 2025-06-23 ENCOUNTER — TELEPHONE (OUTPATIENT)
Dept: CARDIOLOGY | Facility: CLINIC | Age: 70
End: 2025-06-23
Payer: MEDICARE

## 2025-06-23 NOTE — TELEPHONE ENCOUNTER
----- Message from Salvatore Castaneda sent at 6/23/2025  9:52 AM EDT -----  Regarding: RE: Denial  Can we cancel his stress test and get him set up for a follow-up appointment instead?  ----- Message -----  From: Jami Quiroz RN  Sent: 6/23/2025   9:41 AM EDT  To: Salvatore Castaneda MD  Subject: FW: Denial                                         ----- Message -----  From: Bianka Moreno RegSched Rep  Sent: 6/23/2025   9:18 AM EDT  To: Jami Quiroz, FANY  Subject: Denial                                           Good morning-  This pt's Stress test was denied by his Insurance, letter in Media.

## 2025-06-23 NOTE — TELEPHONE ENCOUNTER
Attempted to reach pt. Female answered and asked to take a message. She will have him return my call.

## 2025-06-23 NOTE — TELEPHONE ENCOUNTER
Called number back. There was another message where pt has already been informed about stress test and appt. I spoke to the female and informed her pt did not need to return my call.

## 2025-07-07 RX ORDER — EZETIMIBE 10 MG/1
10 TABLET ORAL DAILY
Qty: 90 TABLET | Refills: 3 | Status: SHIPPED | OUTPATIENT
Start: 2025-07-07

## 2025-07-09 ENCOUNTER — PATIENT ROUNDING (BHMG ONLY) (OUTPATIENT)
Dept: CARDIOLOGY | Facility: CLINIC | Age: 70
End: 2025-07-09
Payer: MEDICARE

## 2025-07-09 ENCOUNTER — OFFICE VISIT (OUTPATIENT)
Dept: CARDIOLOGY | Facility: CLINIC | Age: 70
End: 2025-07-09
Payer: MEDICARE

## 2025-07-09 ENCOUNTER — TELEPHONE (OUTPATIENT)
Dept: CARDIOLOGY | Facility: CLINIC | Age: 70
End: 2025-07-09

## 2025-07-09 VITALS
SYSTOLIC BLOOD PRESSURE: 132 MMHG | HEIGHT: 75 IN | BODY MASS INDEX: 30.31 KG/M2 | DIASTOLIC BLOOD PRESSURE: 68 MMHG | WEIGHT: 243.8 LBS | HEART RATE: 63 BPM | OXYGEN SATURATION: 96 %

## 2025-07-09 DIAGNOSIS — E78.5 HYPERLIPIDEMIA, UNSPECIFIED HYPERLIPIDEMIA TYPE: Primary | ICD-10-CM

## 2025-07-09 DIAGNOSIS — R00.2 PALPITATIONS: ICD-10-CM

## 2025-07-09 DIAGNOSIS — R00.2 PALPITATIONS: Primary | ICD-10-CM

## 2025-07-09 DIAGNOSIS — R07.9 CHEST PAIN, UNSPECIFIED TYPE: ICD-10-CM

## 2025-07-09 DIAGNOSIS — I25.10 CORONARY ARTERY DISEASE, UNSPECIFIED VESSEL OR LESION TYPE, UNSPECIFIED WHETHER ANGINA PRESENT, UNSPECIFIED WHETHER NATIVE OR TRANSPLANTED HEART: ICD-10-CM

## 2025-07-09 DIAGNOSIS — R94.31 ABNORMAL ELECTROCARDIOGRAM (ECG) (EKG): ICD-10-CM

## 2025-07-09 DIAGNOSIS — R06.09 DYSPNEA ON EXERTION: ICD-10-CM

## 2025-07-09 RX ORDER — METOPROLOL TARTRATE 50 MG
TABLET ORAL
Qty: 2 TABLET | Refills: 0 | Status: SHIPPED | OUTPATIENT
Start: 2025-07-09

## 2025-07-09 NOTE — TELEPHONE ENCOUNTER
Order has been placed for coronary CTA.    Left voicemail for pt  that order has been placed and he will be contacted by the  to set up appt. Instructed pt to call back to clinic if he has any questions.

## 2025-07-09 NOTE — PROGRESS NOTES
July 9, 2025    Hello, may I speak with Ayad Tong?    My name is SIRISHA EPSTEIN      I am  with [facility]  [address]  [place] [address] [phone].    Before we get started may I verify your date of birth? 1955    I am calling to officially welcome you to our practice and ask about your recent visit. Is this a good time to talk? yes    Tell me about your visit with us. What things went well?  EVERYTHING WENT WELL, PATIENT DID NOT HAVE TO WAIT AND EVERYTHING WENT WELL WITH THE DOCTOR.       We're always looking for ways to make our patients' experiences even better. Do you have recommendations on ways we may improve?  no    Overall were you satisfied with your first visit to our practice? yes       I appreciate you taking the time to speak with me today. Is there anything else I can do for you? no      Thank you, and have a great day.

## 2025-07-09 NOTE — PROGRESS NOTES
[facility]  Follow Up Visit  Ayad Tong  1955    VISIT DATE:  07/09/25    PCP:   Murray Beltran MD  [address] [id number]          CC:  Hyperlipidemia, unspecified hyperlipidemia type      Problem List:  HLD  DM  HTN     Cardiac testing: Cardiac catheterization 2010.  Nonobstructive disease.  20 to 30% stenosis in the RCA, l LAD 30% stenosis    Holter 2025:    Rare ectopy with less than 1% PACs and PVCs.    Average heart rate 70 bpm.    Short runs of SVT.  The longest lasted 7 beats         History of Present Illness:  Ayad Tong  Is a 70 y.o. male with pertinent cardiac history detailed above.  Patient wore a Holter for palpitations that was relatively benign.  He states the palpitations are relatively infrequent.  He does feel like he is prone to getting dehydrated if he is out in the heat.  Reports he does sweat a lot.  Has mild caffeine consumption.  He has an abnormal baseline EKG, T wave inversions in the lateral leads.  A stress test was ordered to evaluate but was not covered by his insurance.  We did discuss pursuing a coronary CTA to follow-up on his previous nonobstructive CAD given the EKG are not validated his cardiac risk factors.  He will consider this.  His risk factors are well-controlled.      Patient Active Problem List    Diagnosis Date Noted    Type 2 diabetes mellitus with hyperglycemia 02/15/2023    Hyperlipidemia 02/15/2023       Allergies   Allergen Reactions    Meperidine Nausea Only and Unknown - Low Severity     And causes hypertension       Social History     Socioeconomic History    Marital status:    Tobacco Use    Smoking status: Never    Smokeless tobacco: Never   Vaping Use    Vaping status: Never Used   Substance and Sexual Activity    Alcohol use: Yes     Alcohol/week: 1.0 standard drink of alcohol     Types: 1 Drinks containing 0.5 oz of alcohol per week     Comment: occas    Drug use: Never     Sexual activity: Yes     Partners: Female     Birth control/protection: None       Family History   Problem Relation Age of Onset    Dementia Mother     Hypertension Mother     Cancer Father     Lung cancer Father     Hypertension Father     Asthma Sister         hay fever    Diabetes Maternal Uncle     No Known Problems Daughter     No Known Problems Son        Current Medications:    Current Outpatient Medications:     aspirin 81 MG EC tablet, Take 1 tablet by mouth Daily., Disp: 30 tablet, Rfl: 6    atorvastatin (LIPITOR) 80 MG tablet, Take 1 tablet by mouth Daily., Disp: , Rfl:     Blood Glucose Monitoring Suppl (True Metrix Air Glucose Meter) w/Device kit, , Disp: , Rfl:     citalopram (CeleXA) 20 MG tablet, Take 1 tablet by mouth Daily., Disp: , Rfl:     coenzyme Q10 100 MG capsule, Take 3 capsules by mouth Daily., Disp: , Rfl:     Continuous Glucose Sensor (FreeStyle Chapito 3 Plus Sensor), Change sensor every 15 days, Disp: , Rfl:     Droplet Pen Needles 32G X 4 MM misc, , Disp: , Rfl:     ezetimibe (ZETIA) 10 MG tablet, TAKE 1 TABLET EVERY DAY, Disp: 90 tablet, Rfl: 3    fenofibrate 160 MG tablet, Take 1 tablet by mouth Daily., Disp: , Rfl:     insulin aspart (NovoLOG FlexPen) 100 UNIT/ML solution pen-injector sc pen, INJECT AT MEALTIMES AND PER CORRECTIONAL SCALE AS DIRECTED (DISCARD PEN 28 DAYS AFTER OPENING , MAX DAILY DOSE IS 50 UNITS), Disp: , Rfl:     Insulin Degludec (Tresiba FlexTouch) 200 UNIT/ML solution pen-injector pen injection, Inject 34 Units under the skin into the appropriate area as directed Daily., Disp: 30 mL, Rfl: 1    levothyroxine (SYNTHROID, LEVOTHROID) 25 MCG tablet, Take 1 tablet by mouth Daily., Disp: , Rfl:     multivitamin with minerals tablet tablet, Take 1 tablet by mouth Daily., Disp: , Rfl:     Omega-3 Fatty Acids (Fish Oil Ultra) 1400 MG capsule, Take 1 capsule by mouth Daily., Disp: , Rfl:     ramipril (ALTACE) 10 MG capsule, Take 1 capsule by mouth Daily., Disp: , Rfl:      "Tirzepatide (Mounjaro) 5 MG/0.5ML solution auto-injector, Inject 5 mg under the skin into the appropriate area as directed 1 (One) Time Per Week., Disp: 2 mL, Rfl: 3     Review of Systems   Cardiovascular:  Positive for dyspnea on exertion and palpitations. Negative for chest pain and claudication.   Respiratory:  Negative for shortness of breath.        Vitals:    07/09/25 0917   BP: 132/68   BP Location: Left arm   Patient Position: Sitting   Pulse: 63   SpO2: 96%   Weight: 111 kg (243 lb 12.8 oz)   Height: 190.5 cm (75\")       Physical Exam    Diagnostic Data:  Procedures  No results found for: \"CHLPL\", \"TRIG\", \"HDL\", \"LDLDIRECT\"  No results found for: \"GLUCOSE\", \"BUN\", \"CREATININE\", \"NA\", \"K\", \"CL\", \"CO2\"  Lab Results   Component Value Date    HGBA1C 6.2 (A) 03/19/2025     No results found for: \"WBC\", \"HGB\", \"HCT\", \"PLT\"    Assessment:   Diagnosis Plan   1. Hyperlipidemia, unspecified hyperlipidemia type        2. Palpitations        3. Abnormal electrocardiogram (ECG) (EKG)            Plan:    Previous history nonobstructive CAD, abnormal EKG  -Continue aspirin 81 mg and statin therapy  -mild non-specific T wave change lateral leads  - Recommended a coronary CTA due to dyspnea on exertion and his cardiac risk factors.  He will think about this and reach out to us to let us know his decision.     2.  DM  -A1c 6.2  -Management per  Endocrinology, doing well  -GLENN 2023: Right toe brachial index 0.42 suggesting distal small vessel disease  -No claudication symptoms     3.  HLD  -Total cholesterol 108, triglycerides 122, HDL 37, LDL 47  -continue liptior 80mg, Zetia, fenofribate    4.  Palpitations  - Recent benign Holter monitor.  Palpitations are rare and infrequent.  Will continue to monitor.    5.  HTN  -BP controlled on ramipril         ACP discussion was held with the patient during this visit. Patient does not have an advance directive, declines further assistance.      Salvatore Castaneda MD FAC     "

## 2025-07-11 RX ORDER — TIRZEPATIDE 5 MG/.5ML
5 INJECTION, SOLUTION SUBCUTANEOUS WEEKLY
Qty: 2 ML | Refills: 3 | Status: SHIPPED | OUTPATIENT
Start: 2025-07-11

## 2025-07-15 ENCOUNTER — OFFICE VISIT (OUTPATIENT)
Dept: ENDOCRINOLOGY | Facility: CLINIC | Age: 70
End: 2025-07-15
Payer: MEDICARE

## 2025-07-15 VITALS
WEIGHT: 245 LBS | BODY MASS INDEX: 30.46 KG/M2 | HEIGHT: 75 IN | SYSTOLIC BLOOD PRESSURE: 120 MMHG | DIASTOLIC BLOOD PRESSURE: 73 MMHG | HEART RATE: 83 BPM | OXYGEN SATURATION: 98 %

## 2025-07-15 DIAGNOSIS — E11.65 TYPE 2 DIABETES MELLITUS WITH HYPERGLYCEMIA, UNSPECIFIED WHETHER LONG TERM INSULIN USE: Primary | ICD-10-CM

## 2025-07-15 LAB
EXPIRATION DATE: ABNORMAL
EXPIRATION DATE: ABNORMAL
GLUCOSE BLDC GLUCOMTR-MCNC: 145 MG/DL (ref 70–130)
HBA1C MFR BLD: 6.6 % (ref 4.5–5.7)
Lab: ABNORMAL
Lab: ABNORMAL

## 2025-07-15 RX ORDER — TIRZEPATIDE 7.5 MG/.5ML
7.5 INJECTION, SOLUTION SUBCUTANEOUS
Qty: 2 ML | Refills: 5 | Status: SHIPPED | OUTPATIENT
Start: 2025-07-15

## 2025-07-15 NOTE — PATIENT INSTRUCTIONS
Increase Mounjaro to 7.5 mg weekly  When on increased dose reduce Tresiba to 32 units and reduce Novolog to 9 units plus correction

## 2025-07-15 NOTE — PROGRESS NOTES
"     Office Note      Date: 07/15/2025  Patient Name: Ayad Tong  MRN: [id number]  : 1955    Chief Complaint   Patient presents with    Diabetes       History of Present Illness:   Ayad Tong is a 70 y.o. male who presents for follow-up for type 2 diabetes.  He typically sees Dr. Odalis Velazquez but has his grandkids staying with them the same day as his appointment was scheduled.  He reports overall things seem to be going well.  Dr. Velazquez increased his Mounjaro last visit to the 5 mg weekly dose and reduced his insulin.  He has been taking Tresiba 34 units daily and NovoLog 11 units +2 units for every 50 mg/dL his glucose is above 150 mg/dL with meals.  He reports he is not surprised his hemoglobin A1c is up some.  They have been busy and traveling and eating out more this summer and the timing of his insulin has been off.  He reports he has noted his fasting readings have been a little higher the last couple weeks as well.  He continues to use the freestyle henry 3+ continuous glucose monitor.  He denies any trouble with severe or frequent hypoglycemia.  He is up-to-date on his eye exam he goes annually.  He denies any trouble with his feet today.  He had his foot exam completed at his appointment in March.  He had fasting labs completed with his primary care physician in January.      Subjective     Review of Systems:   Review of Systems   Constitutional: Negative.    Cardiovascular: Negative.    Gastrointestinal: Negative.    Endocrine: Negative.    Neurological: Negative.        The following portions of the patient's history were reviewed and updated as appropriate: allergies, current medications, past family history, past medical history, past social history, past surgical history, and problem list.    Objective     Vitals:    07/15/25 0910   BP: 120/73   Pulse: 83   SpO2: 98%   Weight: 111 kg (245 lb)   Height: 190.5 cm (75\")     Body mass index is 30.62 kg/m².    Physical Exam  Vitals " reviewed.   Constitutional:       General: He is not in acute distress.     Appearance: Normal appearance.   Neurological:      Mental Status: He is alert.         HEMOGLOBIN A1C  Lab Results   Component Value Date    HGBA1C 6.6 (A) 07/15/2025       GLUCOSE  Glucose   Date Value Ref Range Status   07/15/2025 145 (A) 70 - 130 mg/dL Final       URINE MICROALBUMIN/CREATININE RATIO  Microalbumin/Creatinine Ratio   Date Value Ref Range Status   01/02/2025 38.0 (A) 0.0 - 29.0 mg/g Final           Assessment / Plan      Assessment & Plan:  1. Type 2 diabetes mellitus with hyperglycemia, unspecified whether long term insulin use  His hemoglobin A1c is up slightly as compared to March but still to goal at 6.6%.  I reviewed his freestyle henry download.  For the last 2 weeks his average glucose is 147 mg/dL with 90% of his readings within target range, 10% high, 0% very high, 0% low and 0% very low.  He reports ultimately he would like to try to get off his NovoLog.  We will try increasing his Mounjaro to the 7.5 mg weekly dose.  He just finished his last pen of the 5 mg and will start this once he gets the prescription.  We discussed that when he starts the increased dose of Mounjaro we will reduce his Tresiba to 32 units daily and his NovoLog to 9 units +2 units for every 50 mg/dL his glucose is above 150 mg/dL.  He will reach out with blood sugar readings as needed.  His weight is down 7 pounds since his appointment in March.  I encouraged continued healthy eating habits and physical activity as tolerated.  His blood pressure is okay today.  - POC Glucose, Blood  - POC Glycosylated Hemoglobin (Hb A1C)      Return for keep appt in October with Dr. Velazquez.     This note was dictated using Dragon voice recognition.    Electronically signed by: SANTANA Gómez  07/15/2025

## 2025-07-25 ENCOUNTER — TELEPHONE (OUTPATIENT)
Facility: HOSPITAL | Age: 70
End: 2025-07-25
Payer: MEDICARE

## 2025-07-25 NOTE — TELEPHONE ENCOUNTER
Pt contacted as pre-procedure phone call prior to planned CTA coronary for 7-.Reviewed with patient arrival time of 0730 AM to main Radiology registration at [facility]. Hydrate well the night before and morning of the procedure then nothing to eat or drink by mouth 2 hours prior to arrival except sips of water with medications. No caffeine or nicotine after midnight, please take pre-medications night before and morning of procedure with a small sip of water as instructed by your Physician,  recommended,  reviewed procedure instructions and allowed time for questions, and reviewed home medications, allergies, and medical history.  
Detail Level: Detailed

## 2025-07-28 ENCOUNTER — HOSPITAL ENCOUNTER (OUTPATIENT)
Facility: HOSPITAL | Age: 70
Discharge: HOME | End: 2025-07-28
Payer: MEDICARE

## 2025-07-28 VITALS
DIASTOLIC BLOOD PRESSURE: 58 MMHG | TEMPERATURE: 97.1 F | SYSTOLIC BLOOD PRESSURE: 105 MMHG | BODY MASS INDEX: 30.04 KG/M2 | OXYGEN SATURATION: 100 % | HEIGHT: 75 IN | HEART RATE: 51 BPM | WEIGHT: 241.62 LBS | RESPIRATION RATE: 18 BRPM

## 2025-07-28 DIAGNOSIS — R07.9 CHEST PAIN, UNSPECIFIED TYPE: ICD-10-CM

## 2025-07-28 DIAGNOSIS — I25.10 CORONARY ARTERY DISEASE, UNSPECIFIED VESSEL OR LESION TYPE, UNSPECIFIED WHETHER ANGINA PRESENT, UNSPECIFIED WHETHER NATIVE OR TRANSPLANTED HEART: ICD-10-CM

## 2025-07-28 DIAGNOSIS — R00.2 PALPITATIONS: ICD-10-CM

## 2025-07-28 DIAGNOSIS — R06.09 DYSPNEA ON EXERTION: ICD-10-CM

## 2025-07-28 PROCEDURE — 75574 CT ANGIO HRT W/3D IMAGE: CPT | Performed by: INTERNAL MEDICINE

## 2025-07-28 PROCEDURE — 25510000001 IOPAMIDOL PER 1 ML: Performed by: INTERNAL MEDICINE

## 2025-07-28 PROCEDURE — 63710000001 NITROGLYCERIN 0.4 MG SUBLINGUAL TABLET: Performed by: INTERNAL MEDICINE

## 2025-07-28 PROCEDURE — 75574 CT ANGIO HRT W/3D IMAGE: CPT

## 2025-07-28 PROCEDURE — A9270 NON-COVERED ITEM OR SERVICE: HCPCS | Performed by: INTERNAL MEDICINE

## 2025-07-28 RX ORDER — METOPROLOL TARTRATE 100 MG/1
200 TABLET ORAL ONCE
Status: DISCONTINUED | OUTPATIENT
Start: 2025-07-28 | End: 2025-07-29 | Stop reason: HOSPADM

## 2025-07-28 RX ORDER — METOPROLOL TARTRATE 100 MG/1
100 TABLET ORAL ONCE
Status: DISCONTINUED | OUTPATIENT
Start: 2025-07-28 | End: 2025-07-29 | Stop reason: HOSPADM

## 2025-07-28 RX ORDER — METOPROLOL TARTRATE 25 MG/1
50 TABLET, FILM COATED ORAL ONCE
Status: DISCONTINUED | OUTPATIENT
Start: 2025-07-28 | End: 2025-07-29 | Stop reason: HOSPADM

## 2025-07-28 RX ORDER — METOPROLOL TARTRATE 1 MG/ML
5 INJECTION, SOLUTION INTRAVENOUS
Status: DISCONTINUED | OUTPATIENT
Start: 2025-07-28 | End: 2025-07-29 | Stop reason: HOSPADM

## 2025-07-28 RX ORDER — NITROGLYCERIN 0.4 MG/1
0.8 TABLET SUBLINGUAL
Status: COMPLETED | OUTPATIENT
Start: 2025-07-28 | End: 2025-07-28

## 2025-07-28 RX ORDER — IVABRADINE 5 MG/1
15 TABLET, FILM COATED ORAL ONCE
Status: DISCONTINUED | OUTPATIENT
Start: 2025-07-28 | End: 2025-07-29 | Stop reason: HOSPADM

## 2025-07-28 RX ORDER — METOPROLOL TARTRATE 25 MG/1
50 TABLET, FILM COATED ORAL
Status: DISCONTINUED | OUTPATIENT
Start: 2025-07-28 | End: 2025-07-29 | Stop reason: HOSPADM

## 2025-07-28 RX ORDER — NITROGLYCERIN 0.4 MG/1
0.4 TABLET SUBLINGUAL
Status: COMPLETED | OUTPATIENT
Start: 2025-07-28 | End: 2025-07-28

## 2025-07-28 RX ORDER — IOPAMIDOL 755 MG/ML
100 INJECTION, SOLUTION INTRAVASCULAR
Status: COMPLETED | OUTPATIENT
Start: 2025-07-28 | End: 2025-07-28

## 2025-07-28 RX ADMIN — NITROGLYCERIN 0.8 MG: 0.4 TABLET SUBLINGUAL at 08:15

## 2025-07-28 RX ADMIN — IOPAMIDOL 75 ML: 755 INJECTION, SOLUTION INTRAVENOUS at 08:29

## 2025-08-04 ENCOUNTER — TELEPHONE (OUTPATIENT)
Dept: CARDIOLOGY | Facility: CLINIC | Age: 70
End: 2025-08-04
Payer: MEDICARE

## 2025-08-04 ENCOUNTER — HOSPITAL ENCOUNTER (OUTPATIENT)
Dept: CT IMAGING | Facility: HOSPITAL | Age: 70
Discharge: HOME | End: 2025-08-04
Admitting: INTERNAL MEDICINE
Payer: MEDICARE

## 2025-08-04 DIAGNOSIS — R93.1 ABNORMAL FINDINGS ON DIAGNOSTIC IMAGING OF HEART/CORONARY CIRCULATION: Primary | ICD-10-CM

## 2025-08-04 DIAGNOSIS — R93.1 ABNORMAL FINDINGS ON DIAGNOSTIC IMAGING OF HEART/CORONARY CIRCULATION: ICD-10-CM

## 2025-08-04 PROCEDURE — 75580 N-INVAS EST C FFR SW ALY CTA: CPT | Performed by: INTERNAL MEDICINE

## 2025-08-04 PROCEDURE — 75580 N-INVAS EST C FFR SW ALY CTA: CPT

## 2025-08-25 ENCOUNTER — TELEPHONE (OUTPATIENT)
Dept: ENDOCRINOLOGY | Facility: CLINIC | Age: 70
End: 2025-08-25
Payer: MEDICARE